# Patient Record
Sex: FEMALE | Race: WHITE | NOT HISPANIC OR LATINO | Employment: FULL TIME | URBAN - METROPOLITAN AREA
[De-identification: names, ages, dates, MRNs, and addresses within clinical notes are randomized per-mention and may not be internally consistent; named-entity substitution may affect disease eponyms.]

---

## 2023-07-26 ENCOUNTER — TELEPHONE (OUTPATIENT)
Dept: CARDIOLOGY | Facility: CLINIC | Age: 58
End: 2023-07-26
Payer: OTHER GOVERNMENT

## 2023-07-26 NOTE — TELEPHONE ENCOUNTER
She was at [facility], she was having elevated heart rates. She converted on her own. I had a cancellation for this Friday, she will be here.

## 2023-07-26 NOTE — TELEPHONE ENCOUNTER
----- Message from Sylwia Cancino sent at 7/26/2023 10:54 AM CDT -----  Type:  Sooner Appointment Request    Caller is requesting a sooner appointment.  Caller declined first available appointment listed below.  Caller will not accept being placed on the waitlist and is requesting a message be sent to doctor.    Name of Caller:  pt  When is the first available appointment?  8/24--said she need to be seen sooner--please call and advise  Symptoms:  hospital f/u racing heart-- need 2 week appt  Best Call Back Number:  [phone] (home)     Additional Information:  thank you

## 2023-07-28 ENCOUNTER — OFFICE VISIT (OUTPATIENT)
Dept: CARDIOLOGY | Facility: CLINIC | Age: 58
End: 2023-07-28
Payer: OTHER GOVERNMENT

## 2023-07-28 VITALS
DIASTOLIC BLOOD PRESSURE: 80 MMHG | HEART RATE: 60 BPM | OXYGEN SATURATION: 98 % | HEIGHT: 67 IN | WEIGHT: 150 LBS | SYSTOLIC BLOOD PRESSURE: 120 MMHG | BODY MASS INDEX: 23.54 KG/M2 | RESPIRATION RATE: 16 BRPM

## 2023-07-28 DIAGNOSIS — I48.0 PAROXYSMAL ATRIAL FIBRILLATION: ICD-10-CM

## 2023-07-28 DIAGNOSIS — R07.89 OTHER CHEST PAIN: Primary | ICD-10-CM

## 2023-07-28 PROCEDURE — 93005 ELECTROCARDIOGRAM TRACING: CPT | Mod: PBBFAC,PN | Performed by: INTERNAL MEDICINE

## 2023-07-28 PROCEDURE — 99214 OFFICE O/P EST MOD 30 MIN: CPT | Mod: PBBFAC,PN | Performed by: INTERNAL MEDICINE

## 2023-07-28 PROCEDURE — 99204 PR OFFICE/OUTPT VISIT, NEW, LEVL IV, 45-59 MIN: ICD-10-PCS | Mod: S$PBB,,, | Performed by: INTERNAL MEDICINE

## 2023-07-28 PROCEDURE — 99204 OFFICE O/P NEW MOD 45 MIN: CPT | Mod: S$PBB,,, | Performed by: INTERNAL MEDICINE

## 2023-07-28 PROCEDURE — 93010 ELECTROCARDIOGRAM REPORT: CPT | Mod: S$PBB,,, | Performed by: INTERNAL MEDICINE

## 2023-07-28 PROCEDURE — 99999 PR PBB SHADOW E&M-EST. PATIENT-LVL IV: ICD-10-PCS | Mod: PBBFAC,,, | Performed by: INTERNAL MEDICINE

## 2023-07-28 PROCEDURE — 93010 EKG 12-LEAD: ICD-10-PCS | Mod: S$PBB,,, | Performed by: INTERNAL MEDICINE

## 2023-07-28 PROCEDURE — 99999 PR PBB SHADOW E&M-EST. PATIENT-LVL IV: CPT | Mod: PBBFAC,,, | Performed by: INTERNAL MEDICINE

## 2023-07-28 RX ORDER — DEXTROAMPHETAMINE SACCHARATE, AMPHETAMINE ASPARTATE MONOHYDRATE, DEXTROAMPHETAMINE SULFATE AND AMPHETAMINE SULFATE 5; 5; 5; 5 MG/1; MG/1; MG/1; MG/1
20 CAPSULE, EXTENDED RELEASE ORAL EVERY MORNING
COMMUNITY

## 2023-07-28 RX ORDER — ATENOLOL 25 MG/1
25 TABLET ORAL DAILY
COMMUNITY
End: 2023-09-13

## 2023-07-28 RX ORDER — NAPROXEN SODIUM 220 MG/1
81 TABLET, FILM COATED ORAL DAILY
COMMUNITY
End: 2023-09-13

## 2023-07-28 RX ORDER — DEXTROAMPHETAMINE SACCHARATE, AMPHETAMINE ASPARTATE MONOHYDRATE, DEXTROAMPHETAMINE SULFATE AND AMPHETAMINE SULFATE 2.5; 2.5; 2.5; 2.5 MG/1; MG/1; MG/1; MG/1
10 CAPSULE, EXTENDED RELEASE ORAL EVERY MORNING
COMMUNITY

## 2023-07-28 RX ORDER — DIAZEPAM 2 MG/1
2 TABLET ORAL EVERY 6 HOURS PRN
COMMUNITY

## 2023-07-28 RX ORDER — POTASSIUM CHLORIDE 750 MG/1
10 TABLET, EXTENDED RELEASE ORAL
COMMUNITY
Start: 2023-05-08

## 2023-07-28 RX ORDER — ESTRADIOL 1 MG/1
1 TABLET ORAL
COMMUNITY
Start: 2023-07-05

## 2023-07-28 RX ORDER — PROPAFENONE HYDROCHLORIDE 300 MG/1
300 TABLET, COATED ORAL 3 TIMES DAILY
COMMUNITY
Start: 2023-07-05 | End: 2023-09-29

## 2023-07-28 NOTE — ASSESSMENT & PLAN NOTE
She has chest pressure associated with rapid ventricular rates.  A recommend to do the   1. Recommend to obtain a symptom limited exercise stress test 1 to evaluate for exercise-induced arrhythmia and breakthrough and also a nuclear imaging study to evaluate for any progression of ischemic heart disease could be contributing.    2. Obtain echocardiogram for chamber size measurements and valvular morphology  Her last echocardiogram shows LV function is normal and left atrial size also normal.  Although this is encouraging need to confirm that this is still maintain the same.

## 2023-07-28 NOTE — PROGRESS NOTES
Subjective:    Patient ID:  Lor Albarran is a 57 y.o. female patient here for evaluation Establish Care (She was seen in [facility], she was in Afib, she was given a dose of Cardizem 20 mg and converted on her own)      History of Present Illness:  Patient is a 57-year-old lady has history of atrial arrhythmias initially diagnosed as supraventricular tachycardia subsequently to have some atrial fibrillation with rapid ventricular rates noted.  She had full workup in [place] in March of 2022 and since then she is been maintaining Rythmol 300 mg 3 times a day along with atenolol 25 mg once a day and potassium supplements.  She has been having intermittent episodes of breakthrough palpitations with varying intensity and duration but more recently about a week ago she presented to [facility] both more sustained palpitations heart rate was up to 180 beats per minute with a rapid ventricular rates.  And she was treated with Cardizem at 20 mg bolus with subsequent conversion to sinus rhythm after while.  This leftover with sensation of fullness in her chest anxiety feeling and she was advised admission although since patient was regular rhythm she was discharged home.    She is now seeking follow-up evaluation regarding the same.  Sustained palpitations occur at least once a month sometimes more often but intermittent minor palpitations occur throughout the day with few flutters in her chest and then resolved spontaneously especially when she is lying down.  No cough or congestion no fevers chills no nausea vomiting no blood in the stools black stools she is doing full-time work 3 days a week.  Twelve hours scheduled each day          Review of patient's allergies indicates:   Allergen Reactions    Sulfa (sulfonamide antibiotics)        History reviewed. No pertinent past medical history.  History reviewed. No pertinent surgical history.  Social History     Tobacco Use    Smoking status: Never    Smokeless  tobacco: Never        Review of Systems   As noted in HPI in addition     Constitutional: Negative for chills, fatigue and fever.   Eyes: No double vision, No blurred vision  Neuro: No headaches, No dizziness  Respiratory: Negative for cough, shortness of breath and wheezing.    Cardiovascular: Negative for chest pain.  Intermittent episodes of palpitations noted..   Gastrointestinal: Negative for abdominal pain, No melena, diarrhea, nausea and vomiting.   Genitourinary: Negative for dysuria and frequency, Negative for hematuria  Skin: Negative for bruising, Negative for edema or discoloration noted.   Endocrine: Negative for polyphagia, Negative for heat intolerance, Negative for cold intolerance  Psychiatric: Negative for depression, Negative for anxiety, Negative for memory loss  Musculoskeletal: Negative for neck pain, Negative for muscle weakness, Negative for back pain   She does feel anxious at times and feels like her heart is pounding intermittently.  Patient has been maintain on Adderall often at 20 mg once a day       Objective        Vitals:    07/28/23 0930   BP: 120/80   Pulse: 60   Resp: 16       LIPIDS - LAST 2   No results found for: CHOL, HDL, LDLCALC, TRIG, CHOLHDL    CBC - LAST 2  No results found for: WBC, RBC, HGB, HCT, MCV, MCH, MCHC, RDW, PLT, MPV, GRAN, LYMPH, MONO, BASO, NRBC    CHEMISTRY & LIVER FUNCTION - LAST 2  No results found for: NA, K, CL, CO2, ANIONGAP, BUN, CREATININE, GLU, CALCIUM, PH, MG, ALBUMIN, PROT, ALKPHOS, ALT, AST, BILITOT     CARDIAC PROFILE - LAST 2  No results found for: BNP, CPK, CPKMB, LDH, TROPONINI, TROPONINIHS     COAGULATION - LAST 2  No results found for: LABPT, INR, APTT    ENDOCRINE & PSA - LAST 2  No results found for: HGBA1C, MICROALBUR, TSH, PROCAL, PSA     ECHOCARDIOGRAM RESULTS  No results found for this or any previous visit.      CURRENT/PREVIOUS VISIT EKG  No results found for this or any previous visit.  No valid procedures specified.   No results  found for this or any previous visit.    No valid procedures specified.    March of 2022  Findings   Left Ventricle   The left ventricle is normal in size, thickness, and systolic function.   The EF is estimated at about 60%.   The LV diastolic filling pattern appears normal.   Left Atrium   The left atrial chamber is normal.   Right Ventricle   The right ventricular size ,thickness and systolic function are within   normal limits.   Right Atrium   The right atrium is normal.   Mitral Valve   The mitral valve appears thickened.   There is trace mitral regurgitation present.   Aortic Valve   The aortic valve leaflets are mildly thickened.   Tricuspid Valve   The tricuspid valve appears normal. There is trace tricuspid regurgitation   present. Pulmonary hypertension was not present.   Pulmonic Valve   The pulmonic valve appears normal.   Pericardial Effusion   The pericardium appears normal.   Pleural Effusion   No evidence of pleural effusion.   Miscellaneous   The aorta appears normal.     PREVIOUS STRESS TEST              PREVIOUS ANGIOGRAM        PHYSICAL EXAM    GENERAL: well built, well nourished, well-developed in no apparent distress alert and oriented.   HEENT: Normocephalic. Pupils normal and conjunctivae normal.  Mucous membranes normal, no cyanosis or icterus, trachea central,no pallor or icterus is noted..   NECK: No JVD. No bruit..   THYROID: Thyroid not enlarged. No nodules present..   CARDIAC: Regular rate and rhythm. S1 is normal.S2 is normal.No gallops, clicks or murmurs noted at this time.  CHEST ANATOMY: normal.   LUNGS: Clear to auscultation. No wheezing or rhonchi..   ABDOMEN: Soft no masses or organomegaly.  No abdomen pulsations or bruits.  Normal bowel sounds. No pulsations and no masses felt, No guarding or rebound.   EXTREMITIES: No cyanosis, clubbing or edema noted at this time., no calf tenderness bilaterally.   PERIPHERAL VASCULAR SYSTEM: Good palpable distal pulses.   CENTRAL NERVOUS  SYSTEM: No focal motor or sensory deficits noted.   SKIN: Skin without lesions, moist, well perfused.   MUSCLE STRENGTH & TONE: No noteable weakness, atrophy or abnormal movement.     I HAVE REVIEWED :    The vital signs, nurses notes, and all the pertinent radiology and labs.    07/28/2023 EKG shows normal sinus rhythm rate of 69 beats per minute incomplete right bundle branch morphology borderline criteria for LVH all his normal variant.  QTC is 469 milliseconds.  Other mild QT prolongation this is unchanged from previous EKG of March 2, 2022.    Current Outpatient Medications   Medication Instructions    aspirin 81 mg, Oral, Daily    atenoloL (TENORMIN) 25 mg, Oral, Daily, qhs    dextroamphetamine-amphetamine (ADDERALL XR) 10 MG 24 hr capsule 10 mg, Oral, Every morning, Half when working     dextroamphetamine-amphetamine (ADDERALL XR) 20 MG 24 hr capsule 20 mg, Oral, Every morning    diazePAM (VALIUM) 2 mg, Oral, Every 6 hours PRN    estradioL (ESTRACE) 1 mg, Oral    potassium chloride (KLOR-CON) 10 MEQ TbSR 10 mEq, Oral    propafenone (RYTHMOL) 300 mg, Oral, 3 times daily          Assessment & Plan     Paroxysmal atrial fibrillation  Currently she is maintaining on Rythmol 300 mg 3 times a day I have encouraged her to continue the same continue on atenolol at least 25 mg once daily.    Add magnesium oxide 400 mg daily to her regimen and addition to potassium 10 mEq a day.    She is currently on aspirin 81 mg a day chads2 score is 0 and continue the same.  For further investigation out encourage her to obtain a 7 day event recorder to assess the density and frequency of breakthrough on her monitor.    Other chest pain  She has chest pressure associated with rapid ventricular rates.  A recommend to do the   1. Recommend to obtain a symptom limited exercise stress test 1 to evaluate for exercise-induced arrhythmia and breakthrough and also a nuclear imaging study to evaluate for any progression of ischemic heart  disease could be contributing.    2. Obtain echocardiogram for chamber size measurements and valvular morphology  Her last echocardiogram shows LV function is normal and left atrial size also normal.  Although this is encouraging need to confirm that this is still maintain the same.    Going to avoid any provoking agents such as anti histamines other than H2 blockers H1 blockers she is on Zyrtec encouraged her to continue same.    Also avoid any drugs that can potentially prolonged QT interval including some antibiotics and like agents.      No follow-ups on file.

## 2023-07-28 NOTE — ASSESSMENT & PLAN NOTE
Currently she is maintaining on Rythmol 300 mg 3 times a day I have encouraged her to continue the same continue on atenolol at least 25 mg once daily.    Add magnesium oxide 400 mg daily to her regimen and addition to potassium 10 mEq a day.    She is currently on aspirin 81 mg a day chads2 score is 0 and continue the same.  For further investigation out encourage her to obtain a 7 day event recorder to assess the density and frequency of breakthrough on her monitor.

## 2023-08-01 DIAGNOSIS — I48.0 PAROXYSMAL ATRIAL FIBRILLATION: Primary | ICD-10-CM

## 2023-08-18 ENCOUNTER — TELEPHONE (OUTPATIENT)
Dept: CARDIOLOGY | Facility: CLINIC | Age: 58
End: 2023-08-18
Payer: OTHER GOVERNMENT

## 2023-08-18 ENCOUNTER — TELEPHONE (OUTPATIENT)
Dept: CARDIOLOGY | Facility: HOSPITAL | Age: 58
End: 2023-08-18

## 2023-08-18 NOTE — TELEPHONE ENCOUNTER
----- Message from Roel Ghosh sent at 8/18/2023 11:21 AM CDT -----  Regarding: Return Call  Contact: patient  Type:  Patient Returning Call    Who Called:patient  Who Left Message for Patient:office nurse  Does the patient know what this is regarding?:rescheduling upcoming appointment  Would the patient rather a call back or a response via [facility]? call  Best Call Back Number:[phone]  Additional Information:

## 2023-08-18 NOTE — TELEPHONE ENCOUNTER
Left message on voicemail.    Patient advised, test will be at [facility] ([address]  Will need to register on the first floor at the main entrance.  Patient advised that arrival time is 7:20.  Patient advised that she may be here about 3.5-4 hours, and may want to bring something to occupy their time, as there will be periods of waiting.    Patient advised, may take her medications prior to testing if you need to.  Patient should HOLD Atenolol. Advised if she needs to eat to take her medications, please keep it light, like toast and juice.    Patient advised to avoid all caffeine 12 hours prior to testing.  This includes decaf tea and coffee.    Will provide peanut butter crackers for a snack after stress test.  If patient would prefer something else, please bring a snack from home.    Wear comfortable clothing.  No lotions, oils, or powders to the upper chest area. May wear deodorant.    No metal jewelry, buttons, or zippers to the upper body.  Advised to call the office if any questions.    Will send instructions via [facility] as well.

## 2023-08-30 ENCOUNTER — TELEPHONE (OUTPATIENT)
Dept: CARDIOLOGY | Facility: CLINIC | Age: 58
End: 2023-08-30
Payer: OTHER GOVERNMENT

## 2023-08-30 NOTE — TELEPHONE ENCOUNTER
----- Message from Corey Nelson sent at 8/30/2023  1:49 PM CDT -----  Type: Need Medical Advice   Who Called:Yonis Smith callback number:   Additional Information:  of patient would like a call back to discuss his wife condition, she is currently in the hospital at [facility]  Please call to further assist, Thanks.

## 2023-09-01 ENCOUNTER — TELEPHONE (OUTPATIENT)
Dept: CARDIOLOGY | Facility: CLINIC | Age: 58
End: 2023-09-01
Payer: OTHER GOVERNMENT

## 2023-09-01 NOTE — TELEPHONE ENCOUNTER
----- Message from Erica Chang NP sent at 9/1/2023 12:42 PM CDT -----  Patient's PCP reached out to me today. Looks like this patient was recently hospitalized and has had issues with Afib. Her  called on 8/30/23. They are needing a call back to discuss and hopefully get a follow up appointment. Thank you!

## 2023-09-05 NOTE — TELEPHONE ENCOUNTER
Genny called and spoke with the daughter. Dr. Lockwood advised them to have the eval of the watchman while she is at [facility]

## 2023-09-11 ENCOUNTER — TELEPHONE (OUTPATIENT)
Dept: CARDIOLOGY | Facility: CLINIC | Age: 58
End: 2023-09-11
Payer: OTHER GOVERNMENT

## 2023-09-11 NOTE — TELEPHONE ENCOUNTER
----- Message from Roel Ghosh sent at 9/11/2023  1:38 PM CDT -----  Regarding: return call  Contact:  Calos Dover  Type:  Patient Returning Call    Who Called:Calos Dover   Who Left Message for Patient:office nurse  Does the patient know what this is regarding?:upcoming appointment/records from [facility]  Would the patient rather a call back or a response via [facility]? Please call to advise  Best Call Back Number:[phone]  Additional Information:

## 2023-09-13 ENCOUNTER — LAB VISIT (OUTPATIENT)
Dept: LAB | Facility: HOSPITAL | Age: 58
End: 2023-09-13
Attending: NURSE PRACTITIONER
Payer: OTHER GOVERNMENT

## 2023-09-13 ENCOUNTER — TELEPHONE (OUTPATIENT)
Dept: ELECTROPHYSIOLOGY | Facility: CLINIC | Age: 58
End: 2023-09-13
Payer: OTHER GOVERNMENT

## 2023-09-13 ENCOUNTER — OFFICE VISIT (OUTPATIENT)
Dept: CARDIOLOGY | Facility: CLINIC | Age: 58
End: 2023-09-13
Payer: OTHER GOVERNMENT

## 2023-09-13 VITALS
SYSTOLIC BLOOD PRESSURE: 102 MMHG | WEIGHT: 150 LBS | HEART RATE: 53 BPM | OXYGEN SATURATION: 99 % | BODY MASS INDEX: 23.49 KG/M2 | DIASTOLIC BLOOD PRESSURE: 67 MMHG

## 2023-09-13 DIAGNOSIS — I47.10 SVT (SUPRAVENTRICULAR TACHYCARDIA): Primary | ICD-10-CM

## 2023-09-13 DIAGNOSIS — I48.0 PAROXYSMAL ATRIAL FIBRILLATION: ICD-10-CM

## 2023-09-13 DIAGNOSIS — I47.10 SVT (SUPRAVENTRICULAR TACHYCARDIA): ICD-10-CM

## 2023-09-13 LAB
ANION GAP SERPL CALC-SCNC: 7 MMOL/L (ref 8–16)
BUN SERPL-MCNC: 15 MG/DL (ref 6–20)
CALCIUM SERPL-MCNC: 8.8 MG/DL (ref 8.7–10.5)
CHLORIDE SERPL-SCNC: 102 MMOL/L (ref 95–110)
CO2 SERPL-SCNC: 27 MMOL/L (ref 23–29)
CREAT SERPL-MCNC: 0.9 MG/DL (ref 0.5–1.4)
EST. GFR  (NO RACE VARIABLE): >60 ML/MIN/1.73 M^2
GLUCOSE SERPL-MCNC: 92 MG/DL (ref 70–110)
MAGNESIUM SERPL-MCNC: 2 MG/DL (ref 1.6–2.6)
POTASSIUM SERPL-SCNC: 4.1 MMOL/L (ref 3.5–5.1)
SODIUM SERPL-SCNC: 136 MMOL/L (ref 136–145)

## 2023-09-13 PROCEDURE — 99999 PR PBB SHADOW E&M-EST. PATIENT-LVL III: ICD-10-PCS | Mod: PBBFAC,,, | Performed by: NURSE PRACTITIONER

## 2023-09-13 PROCEDURE — 83735 ASSAY OF MAGNESIUM: CPT | Performed by: NURSE PRACTITIONER

## 2023-09-13 PROCEDURE — 93010 ELECTROCARDIOGRAM REPORT: CPT | Mod: S$PBB,,, | Performed by: INTERNAL MEDICINE

## 2023-09-13 PROCEDURE — 99213 OFFICE O/P EST LOW 20 MIN: CPT | Mod: PBBFAC,PN | Performed by: NURSE PRACTITIONER

## 2023-09-13 PROCEDURE — 80048 BASIC METABOLIC PNL TOTAL CA: CPT | Performed by: NURSE PRACTITIONER

## 2023-09-13 PROCEDURE — 36415 COLL VENOUS BLD VENIPUNCTURE: CPT | Performed by: NURSE PRACTITIONER

## 2023-09-13 PROCEDURE — 93005 ELECTROCARDIOGRAM TRACING: CPT | Mod: PBBFAC,PN | Performed by: INTERNAL MEDICINE

## 2023-09-13 PROCEDURE — 93010 EKG 12-LEAD: ICD-10-PCS | Mod: S$PBB,,, | Performed by: INTERNAL MEDICINE

## 2023-09-13 PROCEDURE — 99999 PR PBB SHADOW E&M-EST. PATIENT-LVL III: CPT | Mod: PBBFAC,,, | Performed by: NURSE PRACTITIONER

## 2023-09-13 PROCEDURE — 99214 PR OFFICE/OUTPT VISIT, EST, LEVL IV, 30-39 MIN: ICD-10-PCS | Mod: S$PBB,,, | Performed by: NURSE PRACTITIONER

## 2023-09-13 PROCEDURE — 99214 OFFICE O/P EST MOD 30 MIN: CPT | Mod: S$PBB,,, | Performed by: NURSE PRACTITIONER

## 2023-09-13 RX ORDER — SOTALOL HYDROCHLORIDE 80 MG/1
80 TABLET ORAL 2 TIMES DAILY
COMMUNITY
Start: 2023-08-31 | End: 2023-09-13

## 2023-09-13 RX ORDER — METOPROLOL SUCCINATE 25 MG/1
25 TABLET, EXTENDED RELEASE ORAL NIGHTLY
Qty: 30 TABLET | Refills: 11 | Status: SHIPPED | OUTPATIENT
Start: 2023-09-13 | End: 2024-03-12 | Stop reason: SDUPTHER

## 2023-09-13 RX ORDER — METOPROLOL TARTRATE 25 MG/1
50 TABLET, FILM COATED ORAL DAILY PRN
Qty: 90 TABLET | Refills: 3 | Status: SHIPPED | OUTPATIENT
Start: 2023-09-13 | End: 2024-03-08

## 2023-09-13 RX ORDER — LANOLIN ALCOHOL/MO/W.PET/CERES
400 CREAM (GRAM) TOPICAL DAILY
Qty: 45 TABLET | Refills: 3
Start: 2023-09-13

## 2023-09-13 NOTE — PROGRESS NOTES
Subjective:    Patient ID:  Lor Albarran is a 57 y.o. female   Chief Complaint   Patient presents with    Hospital Follow Up    Results         HPI:  Patient seen today post hospitalization with complaints of intermittent palpitations and episodes of SVT and paroxysmal AFib.  Patient was seen for her initial visit by Dr. ESTEVAN Lockwood earlier this year and had testing ordered however prior to completion she was [facility] due to SVT.  Patient reports also having gone into atrial fibrillation during that hospitalization.  She would a mildly abnormal troponin level and underwent nuclear stress testing which was negative for reversible ischemia.  Records are noted at today's visit.  Patient was changed off of propafenone and put on sotalol however she continued to have breakthrough palpitations and heart racing post hospitalization and started back on propafenone in addition to sotalol.  She did not tolerate Inderal during the hospitalization due to low blood pressure and low heart rate when in sinus rhythm.  She is significantly concerned about going in and out of AFib and especially in regards to stroke risk.  Her CHADS-VASc score is low however she believes she does go into AFib on a regular basis.  Recent cardiac event monitor did not note any episodes of AFib but did note 7 episodes of SVT.    Review of patient's allergies indicates:   Allergen Reactions    Sulfa (sulfonamide antibiotics)        History reviewed. No pertinent past medical history.  History reviewed. No pertinent surgical history.  Social History     Tobacco Use    Smoking status: Never    Smokeless tobacco: Never     History reviewed. No pertinent family history.     Review of Systems:   Per HPI         Objective:        Vitals:    09/13/23 0816   BP: 102/67   Pulse: (!) 53       Lab Results   Component Value Date     09/13/2023    K 4.1 09/13/2023     09/13/2023    CREATININE 0.9 09/13/2023     BUN 15 09/13/2023    CO2 27 09/13/2023        ECHOCARDIOGRAM RESULTS  No results found for this or any previous visit.        CURRENT/PREVIOUS VISIT EKG  Results for orders placed or performed in visit on 07/28/23   IN OFFICE EKG 12-LEAD (to Ramer)    Collection Time: 07/28/23  9:41 AM    Narrative    Test Reason : I48.0,R07.89,    Vent. Rate : 069 BPM     Atrial Rate : 069 BPM     P-R Int : 146 ms          QRS Dur : 110 ms      QT Int : 438 ms       P-R-T Axes : 071 -16 080 degrees     QTc Int : 469 ms    Normal sinus rhythm  Incomplete right bundle branch block  Minimal voltage criteria for LVH, may be normal variant ( [place] product )  Nonspecific T wave abnormality  Prolonged QT  Abnormal ECG  No previous ECGs available  Confirmed by Ten Lockwood MD ([id number]) on 8/6/2023 12:50:09 PM    Referred By: PEDRO   SELF           Confirmed By:Ten Lockwood MD     No valid procedures specified.   No results found for this or any previous visit.      Physical Exam:  CONSTITUTIONAL: No fever, no chills  HEENT: Normocephalic, atraumatic,pupils reactive to light                 NECK:  No JVD no carotid bruit  CVS: S1S2+, RRR  LUNGS: Clear  ABDOMEN: Soft, NT, BS+  EXTREMITIES: No cyanosis, edema  : No alexis catheter  NEURO: AAO X 3  PSY: Normal affect      Medication List with Changes/Refills   New Medications    APIXABAN (ELIQUIS) 5 MG TAB    Take 1 tablet (5 mg total) by mouth 2 (two) times daily.    MAGNESIUM OXIDE (MAG-OX) 400 MG (241.3 MG MAGNESIUM) TABLET    Take 1 tablet (400 mg total) by mouth once daily.    METOPROLOL SUCCINATE (TOPROL-XL) 25 MG 24 HR TABLET    Take 1 tablet (25 mg total) by mouth every evening.    METOPROLOL TARTRATE (LOPRESSOR) 25 MG TABLET    Take 2 tablets (50 mg total) by mouth daily as needed (HR >120). Take 1-2 tablets PRN when arrhythmias occur   Current Medications    DEXTROAMPHETAMINE-AMPHETAMINE (ADDERALL XR) 10 MG 24 HR CAPSULE    Take 10 mg by mouth every morning. Half when  working    DEXTROAMPHETAMINE-AMPHETAMINE (ADDERALL XR) 20 MG 24 HR CAPSULE    Take 20 mg by mouth every morning.    DIAZEPAM (VALIUM) 2 MG TABLET    Take 2 mg by mouth every 6 (six) hours as needed (muscle spasms).    ESTRADIOL (ESTRACE) 1 MG TABLET    Take 1 mg by mouth.    POTASSIUM CHLORIDE (KLOR-CON) 10 MEQ TBSR    Take 10 mEq by mouth.    PROPAFENONE (RYTHMOL) 300 MG TABLET    Take 300 mg by mouth 3 (three) times daily.   Discontinued Medications    ASPIRIN 81 MG CHEW    Take 81 mg by mouth once daily.    ATENOLOL (TENORMIN) 25 MG TABLET    Take 25 mg by mouth once daily. qhs    SOTALOL (BETAPACE) 80 MG TABLET    Take 80 mg by mouth 2 (two) times daily.             Assessment:       1. SVT (supraventricular tachycardia)    2. Paroxysmal atrial fibrillation         Plan:     Case discussed with Dr. Greene, EP.  Patient advised that she absolutely must avoid taking sotalol and propafenone together.  Instead we will continue on propafenone 300 mg p.o. t.i.d. which has held her out of AFib in the past.  Add metoprolol succinate 25 mg p.o. daily at night.    Will also give her metoprolol tartrate to take 1-2 tablets of 25 mg p.r.n. when episodes of SVT or AFib with RVR occur in attempt to keep her out of the hospital.    In the meantime I will refer her for an urgent consultation with electrophysiology at [facility].  She will be referred to Dr. Kalani Herzog since Dr. Greene will be out for some weeks.    I will also start her on Eliquis 5 mg p.o. b.i.d. due to frequent episodes of AFib.  Would like her to maintain on Eliquis 5 mg p.o. b.i.d. for minimum of 4 weeks after the most recent episode of Afib.    Labs checked today including BMP and magnesium. Labs at goal. Continue KCL 10 meq po daily. Add mag ox 400 mg po daily.   Follow up with EP and then in our office in about 6 weeks.     Problem List Items Addressed This Visit          Unprioritized    Paroxysmal atrial fibrillation    Relevant Medications     metoprolol succinate (TOPROL-XL) 25 MG 24 hr tablet    metoprolol tartrate (LOPRESSOR) 25 MG tablet    apixaban (ELIQUIS) 5 mg Tab    magnesium oxide (MAG-OX) 400 mg (241.3 mg magnesium) tablet    Other Relevant Orders    IN OFFICE EKG 12-LEAD (to Muse)    Basic metabolic panel (Completed)    Magnesium (Completed)    Ambulatory referral/consult to Electrophysiology    SVT (supraventricular tachycardia) - Primary    Relevant Medications    metoprolol succinate (TOPROL-XL) 25 MG 24 hr tablet    metoprolol tartrate (LOPRESSOR) 25 MG tablet    magnesium oxide (MAG-OX) 400 mg (241.3 mg magnesium) tablet    Other Relevant Orders    IN OFFICE EKG 12-LEAD (to Muse)    Basic metabolic panel (Completed)    Magnesium (Completed)    Ambulatory referral/consult to Electrophysiology       Follow up in about 6 weeks (around 10/25/2023).

## 2023-09-29 ENCOUNTER — OFFICE VISIT (OUTPATIENT)
Dept: ELECTROPHYSIOLOGY | Facility: CLINIC | Age: 58
End: 2023-09-29
Payer: OTHER GOVERNMENT

## 2023-09-29 VITALS — BODY MASS INDEX: 23.67 KG/M2 | HEIGHT: 67 IN | HEART RATE: 57 BPM | WEIGHT: 150.81 LBS

## 2023-09-29 DIAGNOSIS — I48.0 PAROXYSMAL ATRIAL FIBRILLATION: Primary | ICD-10-CM

## 2023-09-29 DIAGNOSIS — F33.41 RECURRENT MAJOR DEPRESSIVE DISORDER, IN PARTIAL REMISSION: ICD-10-CM

## 2023-09-29 DIAGNOSIS — F90.2 ATTENTION DEFICIT HYPERACTIVITY DISORDER (ADHD), COMBINED TYPE: ICD-10-CM

## 2023-09-29 DIAGNOSIS — I47.10 SVT (SUPRAVENTRICULAR TACHYCARDIA): ICD-10-CM

## 2023-09-29 DIAGNOSIS — F41.9 ANXIETY: ICD-10-CM

## 2023-09-29 PROBLEM — R07.89 OTHER CHEST PAIN: Status: RESOLVED | Noted: 2023-07-28 | Resolved: 2023-09-29

## 2023-09-29 PROCEDURE — 93010 ELECTROCARDIOGRAM REPORT: CPT | Mod: S$PBB,,, | Performed by: INTERNAL MEDICINE

## 2023-09-29 PROCEDURE — 99999 PR PBB SHADOW E&M-EST. PATIENT-LVL III: CPT | Mod: PBBFAC,,, | Performed by: STUDENT IN AN ORGANIZED HEALTH CARE EDUCATION/TRAINING PROGRAM

## 2023-09-29 PROCEDURE — 99205 OFFICE O/P NEW HI 60 MIN: CPT | Mod: S$PBB,,, | Performed by: STUDENT IN AN ORGANIZED HEALTH CARE EDUCATION/TRAINING PROGRAM

## 2023-09-29 PROCEDURE — 99205 PR OFFICE/OUTPT VISIT, NEW, LEVL V, 60-74 MIN: ICD-10-PCS | Mod: S$PBB,,, | Performed by: STUDENT IN AN ORGANIZED HEALTH CARE EDUCATION/TRAINING PROGRAM

## 2023-09-29 PROCEDURE — 99213 OFFICE O/P EST LOW 20 MIN: CPT | Mod: PBBFAC | Performed by: STUDENT IN AN ORGANIZED HEALTH CARE EDUCATION/TRAINING PROGRAM

## 2023-09-29 PROCEDURE — 93010 RHYTHM STRIP: ICD-10-PCS | Mod: S$PBB,,, | Performed by: INTERNAL MEDICINE

## 2023-09-29 PROCEDURE — 99999 PR PBB SHADOW E&M-EST. PATIENT-LVL III: ICD-10-PCS | Mod: PBBFAC,,, | Performed by: STUDENT IN AN ORGANIZED HEALTH CARE EDUCATION/TRAINING PROGRAM

## 2023-09-29 PROCEDURE — 93005 ELECTROCARDIOGRAM TRACING: CPT | Mod: PBBFAC | Performed by: INTERNAL MEDICINE

## 2023-09-29 RX ORDER — FLECAINIDE ACETATE 100 MG/1
100 TABLET ORAL EVERY 12 HOURS
Status: DISCONTINUED | OUTPATIENT
Start: 2023-09-29 | End: 2023-09-29

## 2023-09-29 RX ORDER — FLECAINIDE ACETATE 100 MG/1
100 TABLET ORAL EVERY 12 HOURS
Qty: 60 TABLET | Refills: 11 | Status: SHIPPED | OUTPATIENT
Start: 2023-09-29 | End: 2023-12-13 | Stop reason: SDUPTHER

## 2023-09-29 NOTE — PROGRESS NOTES
Electrophysiology Clinic Note    Reason for new patient visit: Evaluation and recommendations regarding paroxysmal atrial fibrillation.     PRESENTING HISTORY:     History of Present Illness:  Ms. Lor Albarran is a ya 57-year-old woman who presents today for evaluation and recommendations regarding paroxysmal atrial fibrillation. She has a past medical history significant for paroxysmal atrial fibrillation, depression, anxiety, and ADHD.     She is followed in general cardiology clinic with Dr. Lockwood and SHILPA Chang and was recently seen in clinic on 9/13/2023. She reports that following a COVID infection in January of 2022 that she started having episodic palpitations. She reports symptoms of palpitations, irregular heart beats, and chest fullness, lasting a matter of seconds to several minutes. She was initially seen in [place] with Dr. Tillman, and was started on propafenone for periods of reported SVT. She later had an event of paroxysmal atrial fibrillation captured on an event monitor and was increased to propafenone 300mg po TID with aspirin 81mg po daily. She reports that she was doing well on propafenone until about six months ago when she was started having increased episodes of palpitations that would last for longer periods, occasionally more than an hour. She was admitted to [facility] about six months ago with continued palpitations with recurrent paroxysmal atrial fibrillation, at times with RVR. She was transitioned to sotalol 80mg po BID. She took this medication for about a month and reports that it was ineffective, and she self-discontinued this agent and resumed therapy with her propafenone 300mg po TID. She was subsequently seen with SHILPA Chang and was started on metoprolol succinate 25mg po daily with metoprolol tartrate 25mg po PRN palpitations. She has cut out all caffeine and is drinking 1-2 glasses of wine nightly. This regimen has provided improved control of these events;  however, she continues to experience breakthrough palpitations about once per week. She is presently taking apixaban and denies any adverse bleeding events on this agent.     She presents today with her . In discussion with Ms. Albarran today, she tells me that she is feeling overall quite well. She denies any episodes of dizziness, lightheadedness, syncope/presyncope, shelton chest pain, nausea or vomiting, orthopnea, or PND. She reports intermittent palpitations as above, in addition to symptoms of chest and neck fullness. She reports very mild baseline shortness of breath and dyspnea with significant exertion. She can climb more than one flight of stairs prior to needing to take a break and continues to attempt to increase her level of physical activity.     Review of Systems:  Review of Systems   Constitutional:  Negative for activity change.   HENT:  Negative for nasal congestion, nosebleeds, postnasal drip, rhinorrhea, sinus pressure/congestion, sneezing and sore throat.    Respiratory:  Positive for chest tightness and shortness of breath. Negative for apnea, cough and wheezing.    Cardiovascular:  Positive for palpitations. Negative for chest pain and leg swelling.   Gastrointestinal:  Negative for abdominal distention, abdominal pain, anal bleeding, blood in stool, change in bowel habit, constipation, diarrhea, nausea and vomiting.   Genitourinary:  Negative for dysuria and hematuria.   Musculoskeletal:  Positive for arthralgias and back pain. Negative for gait problem.   Neurological:  Negative for dizziness, seizures, syncope, weakness, light-headedness, headaches and coordination difficulties.        PAST HISTORY:     Past Medical History:  - Paroxysmal atrial fibrillation  - Depression  - Anxiety  - ADHD    No past surgical history on file.    Family History:  She reports that her father has permanent atrial fibrillation.     Social History:  She  reports that she has never smoked. She has never  "used smokeless tobacco. No history on file for alcohol use and drug use. 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     MEDICATIONS & ALLERGIES:     Review of patient's allergies indicates:   Allergen Reactions    Sulfa (sulfonamide antibiotics)        Current Outpatient Medications on File Prior to Visit   Medication Sig Dispense Refill    apixaban (ELIQUIS) 5 mg Tab Take 1 tablet (5 mg total) by mouth 2 (two) times daily. 180 tablet 3    dextroamphetamine-amphetamine (ADDERALL XR) 10 MG 24 hr capsule Take 10 mg by mouth every morning. Half when working      dextroamphetamine-amphetamine (ADDERALL XR) 20 MG 24 hr capsule Take 20 mg by mouth every morning.      diazePAM (VALIUM) 2 MG tablet Take 2 mg by mouth every 6 (six) hours as needed (muscle spasms).      estradioL (ESTRACE) 1 MG tablet Take 1 mg by mouth.      magnesium oxide (MAG-OX) 400 mg (241.3 mg magnesium) tablet Take 1 tablet (400 mg total) by mouth once daily. 45 tablet 3    metoprolol succinate (TOPROL-XL) 25 MG 24 hr tablet Take 1 tablet (25 mg total) by mouth every evening. 30 tablet 11    metoprolol tartrate (LOPRESSOR) 25 MG tablet Take 2 tablets (50 mg total) by mouth daily as needed (HR >120). Take 1-2 tablets PRN when arrhythmias occur 90 tablet 3    potassium chloride (KLOR-CON) 10 MEQ TbSR Take 10 mEq by mouth.      propafenone (RYTHMOL) 300 MG tablet Take 300 mg by mouth 3 (three) times daily.       No current facility-administered medications on file prior to visit.        OBJECTIVE:     Vital Signs:  Pulse (!) 57   Ht 5' 7" (1.702 m)   Wt 68.4 kg (150 lb 12.7 oz)   BMI 23.62 kg/m²     Physical Exam:  Physical Exam  Constitutional:       General: She is not in acute distress.     Appearance: Normal appearance. She is not ill-appearing or diaphoretic.      Comments: Well-appearing woman in NAD.   HENT:      Head: Normocephalic and atraumatic.      Nose: Nose normal.      Mouth/Throat:      " "Mouth: Mucous membranes are moist.      Pharynx: Oropharynx is clear.   Eyes:      Pupils: Pupils are equal, round, and reactive to light.   Cardiovascular:      Rate and Rhythm: Regular rhythm. Bradycardia present.      Pulses: Normal pulses.      Heart sounds: Normal heart sounds. No murmur heard.     No friction rub. No gallop.   Pulmonary:      Effort: Pulmonary effort is normal. No respiratory distress.      Breath sounds: Normal breath sounds. No wheezing, rhonchi or rales.   Chest:      Chest wall: No tenderness.   Abdominal:      General: There is no distension.      Palpations: Abdomen is soft.      Tenderness: There is no abdominal tenderness.   Musculoskeletal:         General: No swelling or tenderness.      Cervical back: Normal range of motion.      Right lower leg: No edema.      Left lower leg: No edema.   Skin:     General: Skin is warm and dry.      Findings: No erythema, lesion or rash.   Neurological:      General: No focal deficit present.      Mental Status: She is alert and oriented to person, place, and time. Mental status is at baseline.      Motor: No weakness.      Gait: Gait normal.   Psychiatric:         Mood and Affect: Mood normal.         Behavior: Behavior normal.        Laboratory Data:  No results found for: "WBC", "HGB", "HCT", "MCV", "PLT"  Lab Results   Component Value Date    GLU 92 09/13/2023     09/13/2023    K 4.1 09/13/2023     09/13/2023    CO2 27 09/13/2023    BUN 15 09/13/2023    CREATININE 0.9 09/13/2023    CALCIUM 8.8 09/13/2023    MG 2.0 09/13/2023     No results found for: "INR", "PROTIME"    Pertinent Cardiac Data:  ECG: Sinus bradycardia with rate of 57 bpm,  ms, IVCD with  ms, QT/QTc 442/430 ms, nonspecific STT changes.     30-Day Ambulatory Event Monitor - 8/2/2023:    Patient had a min HR of 51 bpm, max HR of 167 bpm, and avg HR of 69 bpm. Predominant underlying rhythm was Sinus Rhythm    7 Supraventricular Tachycardia runs occurred, the " run with the fastest interval lasting 4 beats with a max rate of 167 bpm, the longest lasting 39.8 secs with an avg rate of 110 bpm. Supraventricular Tachycardia was detected within +/- 45 seconds of symptomatic patient event(s).    Isolated SVEs were rare (<1.0%), SVE Couplets were rare (<1.0%), and no SVE Triplets were present.    Isolated VEs were rare (<1.0%), and no VE Couplets or VE Triplets were present    Patient activated. The patient complained of 14 episodes. The symptom(s) included chest pain and palpitations. The corresponding rhythm to the patient reported event was SVT.    Monitor Reported Event The patient had SVT.    There were 60 auto-triggered events    See final report    Resting 2D Transthoracic Echocardiogram - 8/29/2023 at [facility]:  - Normal systolic function with LVEF 60-65%. No RWMA.  - Right ventricle normal size and function.   - Normal biatrial size.  - Normal diastolic function.     Pharmacologic Nuclear Cardiac Stress Test - SPECT - 8/30/2023 at [facility]:  - Normal SPECT with no evidence of ischemia or infarction. Normal LVEF. Paroxysmal atrial fibrillation with RVR noted during the study.      ASSESSMENT & PLAN:   Ms. Lor Albarran is a ya 57-year-old woman who presents today for evaluation and recommendations regarding paroxysmal atrial fibrillation. She has a past medical history significant for paroxysmal atrial fibrillation, depression, anxiety, and ADHD.     - We discussed the pathophysiology of atrial fibrillation; specifically, we discussed paroxysmal atrial fibrillation and the concept that some patients may experience paroxysms of atrial fibrillation interrupting periods of sinus rhythm. We discussed that even a relatively low burden of atrial fibrillation continues to have an increased risk of CVA.   - She has remained on propafenone 300mg po TID with improved control of her atrial fibrillation; however, she reports symptoms of transient nausea  and GI upset on this agent. She has failed a prior trial of sotalol therapy. Unfortunately, she continues to have episodes of breakthrough palpitations on propafenone in addition to GI symptoms and would be interested in modification to an alternate agent. We will discontinue her propafenone at this time. She has no history of underlying coronary artery disease, and the results of a recent pharmacologic nuclear stress SPECT revealed no evidence of ischemia, with no evidence of structural heart disease appreciated on echocardiogram. Recently obtained laboratory assessment did not reveal any contraindications to flecainide therapy, and this agent should not interact with her other medications. We will start flecainide 100mg po BID.    - Flecainide displays use-dependency, and jordy blocking agents are generally jointly prescribed with class Ic agents. She is presently maintained on metoprolol succinate 25mg po daily, with metoprolol tartrate 25mg po PRN an episode of palpitations. We will continue this regimen in addition to her flecainide.   - Her [id number] is only 1 (female gender, age less than 64), portending an annual adjusted risk of CVA of 1.3%. She remains on uninterrupted apixaban therapy with no bleeding events reported.   - We discussed the possibility of catheter ablation with pulmonary vein isolation should she continue to have symptoms and AF despite AAD therapy. She voices understanding, and reports that she would consider ablation in the future in the event her atrial fibrillation burden increases.  - Possible underlying drivers of atrial fibrillation were addressed at this appointment, including recommendations for maintenance of a healthy BMI - now a class I recommendation. Review of laboratory data revealed acceptable TSH at 1.910 at [facility]. A request for sleep study was deferred at this time, as she denies any loud snoring or excessive daytime sleepiness.  - We discussed that her Adderall may  act as mild cardiostimulant medication. Generally this effect is insignificant, but may cause her paroxysms of atrial fibrillation to be more frequent. Unfortunately, she does not feel that these agents can be stopped, as her ADHD is presently well-controlled.      This patient will return to clinic with me in three months with flecainide initiation and maintenance of apixaban therapy in the interim. All questions and concerns were addressed at this encounter.     Signing Physician:       GABO Herzog MD  Electrophysiology Attending

## 2023-11-07 ENCOUNTER — OFFICE VISIT (OUTPATIENT)
Dept: CARDIOLOGY | Facility: CLINIC | Age: 58
End: 2023-11-07
Payer: OTHER GOVERNMENT

## 2023-11-07 VITALS
HEART RATE: 63 BPM | BODY MASS INDEX: 23.39 KG/M2 | SYSTOLIC BLOOD PRESSURE: 108 MMHG | WEIGHT: 149 LBS | HEIGHT: 67 IN | OXYGEN SATURATION: 99 % | DIASTOLIC BLOOD PRESSURE: 68 MMHG

## 2023-11-07 DIAGNOSIS — I48.0 PAROXYSMAL ATRIAL FIBRILLATION: Primary | ICD-10-CM

## 2023-11-07 DIAGNOSIS — I47.10 SVT (SUPRAVENTRICULAR TACHYCARDIA): ICD-10-CM

## 2023-11-07 PROCEDURE — 99999 PR PBB SHADOW E&M-EST. PATIENT-LVL IV: CPT | Mod: PBBFAC,,, | Performed by: NURSE PRACTITIONER

## 2023-11-07 PROCEDURE — 99214 OFFICE O/P EST MOD 30 MIN: CPT | Mod: PBBFAC,PN | Performed by: NURSE PRACTITIONER

## 2023-11-07 PROCEDURE — 99214 PR OFFICE/OUTPT VISIT, EST, LEVL IV, 30-39 MIN: ICD-10-PCS | Mod: S$PBB,,, | Performed by: NURSE PRACTITIONER

## 2023-11-07 PROCEDURE — 99999 PR PBB SHADOW E&M-EST. PATIENT-LVL IV: ICD-10-PCS | Mod: PBBFAC,,, | Performed by: NURSE PRACTITIONER

## 2023-11-07 PROCEDURE — 99214 OFFICE O/P EST MOD 30 MIN: CPT | Mod: S$PBB,,, | Performed by: NURSE PRACTITIONER

## 2023-11-07 NOTE — PROGRESS NOTES
Subjective:    Patient ID:  Lor Dover is a 57 y.o. female   Chief Complaint   Patient presents with    Follow-up       HPI:  Patient presents today for follow-up appointment. She is feeling good overall. She reports only transient palpitations at times. When this occurs, she drinks water and take extra magnesium. She reports compliance with medications which were adjusted by EP at her evaluation. Patient has no complaints of chest pain, dizziness, shortness of breath, or syncope. Denies any falls or head injuries.  Denies any blood in the stool or in the urine.      Review of patient's allergies indicates:   Allergen Reactions    Sulfa (sulfonamide antibiotics)        History reviewed. No pertinent past medical history.  History reviewed. No pertinent surgical history.  Social History     Tobacco Use    Smoking status: Never    Smokeless tobacco: Never     History reviewed. No pertinent family history.     Review of Systems:   Per HPI         Objective:        Vitals:    11/07/23 1007   BP: 108/68   Pulse: 63       Lab Results   Component Value Date     09/13/2023    K 4.1 09/13/2023     09/13/2023    CREATININE 0.9 09/13/2023    BUN 15 09/13/2023    CO2 27 09/13/2023        ECHOCARDIOGRAM RESULTS  No results found for this or any previous visit.        CURRENT/PREVIOUS VISIT EKG  Results for orders placed or performed in visit on 09/13/23   IN OFFICE EKG 12-LEAD (to [place])    Collection Time: 09/13/23  8:26 AM    Narrative    Test Reason : I48.0,    Vent. Rate : 055 BPM     Atrial Rate : 055 BPM     P-R Int : 162 ms          QRS Dur : 114 ms      QT Int : 482 ms       P-R-T Axes : 068 054 028 degrees     QTc Int : 461 ms    Sinus bradycardia  Incomplete right bundle branch block  Nonspecific T wave abnormality  Prolonged QT  Abnormal ECG  When compared with ECG of 28-JUL-2023 09:41,  No significant change was found  Confirmed by Humphrey Lockwood MD ([id number]) on 9/21/2023 6:28:19 PM    Referred By:              Confirmed By:Humphrey Lockwood MD     No valid procedures specified.   No results found for this or any previous visit.      Physical Exam:  CONSTITUTIONAL: No fever, no chills  HEENT: Normocephalic, atraumatic,pupils reactive to light                 NECK:  No JVD no carotid bruit  CVS: S1S2+, RRR, no murmurs,   LUNGS: Clear  ABDOMEN: Soft, NT, BS+  EXTREMITIES: No cyanosis, edema  : No alexis catheter  NEURO: AAO X 3  PSY: Normal affect      Medication List with Changes/Refills   Current Medications    APIXABAN (ELIQUIS) 5 MG TAB    Take 1 tablet (5 mg total) by mouth 2 (two) times daily.    DEXTROAMPHETAMINE-AMPHETAMINE (ADDERALL XR) 10 MG 24 HR CAPSULE    Take 10 mg by mouth every morning. Half when working    DEXTROAMPHETAMINE-AMPHETAMINE (ADDERALL XR) 20 MG 24 HR CAPSULE    Take 20 mg by mouth every morning.    DIAZEPAM (VALIUM) 2 MG TABLET    Take 2 mg by mouth every 6 (six) hours as needed (muscle spasms).    ESTRADIOL (ESTRACE) 1 MG TABLET    Take 1 mg by mouth.    FLECAINIDE (TAMBOCOR) 100 MG TAB    Take 1 tablet (100 mg total) by mouth every 12 (twelve) hours.    MAGNESIUM OXIDE (MAG-OX) 400 MG (241.3 MG MAGNESIUM) TABLET    Take 1 tablet (400 mg total) by mouth once daily.    METOPROLOL SUCCINATE (TOPROL-XL) 25 MG 24 HR TABLET    Take 1 tablet (25 mg total) by mouth every evening.    METOPROLOL TARTRATE (LOPRESSOR) 25 MG TABLET    Take 2 tablets (50 mg total) by mouth daily as needed (HR >120). Take 1-2 tablets PRN when arrhythmias occur    POTASSIUM CHLORIDE (KLOR-CON) 10 MEQ TBSR    Take 10 mEq by mouth.             Assessment:       1. Paroxysmal atrial fibrillation    2. SVT (supraventricular tachycardia)         Plan:     Problem List Items Addressed This Visit          Unprioritized    Paroxysmal atrial fibrillation - Primary    Current Assessment & Plan     Patient has been followed by EP and was started on flecanide 100 mg po BID. She remains on metoprolol succinate 25 mg po QHS. She has  not required PRN metoprolol tartrate.   She is on ELiquis 5 mg po BID.          SVT (supraventricular tachycardia)       Follow up in about 6 months (around 5/7/2024).

## 2023-11-07 NOTE — ASSESSMENT & PLAN NOTE
Patient has been followed by EP and was started on flecanide 100 mg po BID. She remains on metoprolol succinate 25 mg po QHS. She has not required PRN metoprolol tartrate.   She is on ELiquis 5 mg po BID.

## 2023-11-16 DIAGNOSIS — I48.0 PAROXYSMAL ATRIAL FIBRILLATION: ICD-10-CM

## 2023-12-13 RX ORDER — FLECAINIDE ACETATE 100 MG/1
100 TABLET ORAL EVERY 12 HOURS
Qty: 60 TABLET | Refills: 11 | Status: SHIPPED | OUTPATIENT
Start: 2023-12-13 | End: 2024-12-12

## 2023-12-19 DIAGNOSIS — I48.0 PAROXYSMAL ATRIAL FIBRILLATION: Primary | ICD-10-CM

## 2024-02-23 ENCOUNTER — TELEPHONE (OUTPATIENT)
Dept: ELECTROPHYSIOLOGY | Facility: CLINIC | Age: 59
End: 2024-02-23
Payer: OTHER GOVERNMENT

## 2024-03-08 DIAGNOSIS — I48.0 PAROXYSMAL ATRIAL FIBRILLATION: ICD-10-CM

## 2024-03-08 DIAGNOSIS — I47.10 SVT (SUPRAVENTRICULAR TACHYCARDIA): ICD-10-CM

## 2024-03-08 RX ORDER — METOPROLOL TARTRATE 25 MG/1
TABLET, FILM COATED ORAL
Qty: 90 TABLET | Refills: 3 | Status: SHIPPED | OUTPATIENT
Start: 2024-03-08

## 2024-03-12 ENCOUNTER — OFFICE VISIT (OUTPATIENT)
Dept: ELECTROPHYSIOLOGY | Facility: CLINIC | Age: 59
End: 2024-03-12
Payer: OTHER GOVERNMENT

## 2024-03-12 ENCOUNTER — HOSPITAL ENCOUNTER (OUTPATIENT)
Dept: CARDIOLOGY | Facility: CLINIC | Age: 59
Discharge: HOME | End: 2024-03-12
Payer: OTHER GOVERNMENT

## 2024-03-12 VITALS
HEIGHT: 67 IN | DIASTOLIC BLOOD PRESSURE: 62 MMHG | SYSTOLIC BLOOD PRESSURE: 115 MMHG | BODY MASS INDEX: 23.49 KG/M2 | WEIGHT: 149.69 LBS | HEART RATE: 62 BPM

## 2024-03-12 DIAGNOSIS — F41.9 ANXIETY: ICD-10-CM

## 2024-03-12 DIAGNOSIS — F90.2 ATTENTION DEFICIT HYPERACTIVITY DISORDER (ADHD), COMBINED TYPE: ICD-10-CM

## 2024-03-12 DIAGNOSIS — I47.10 SVT (SUPRAVENTRICULAR TACHYCARDIA): ICD-10-CM

## 2024-03-12 DIAGNOSIS — I48.0 PAROXYSMAL ATRIAL FIBRILLATION: ICD-10-CM

## 2024-03-12 DIAGNOSIS — I48.0 PAROXYSMAL ATRIAL FIBRILLATION: Primary | ICD-10-CM

## 2024-03-12 DIAGNOSIS — F33.41 RECURRENT MAJOR DEPRESSIVE DISORDER, IN PARTIAL REMISSION: ICD-10-CM

## 2024-03-12 LAB
OHS QRS DURATION: 122 MS
OHS QTC CALCULATION: 456 MS

## 2024-03-12 PROCEDURE — 99213 OFFICE O/P EST LOW 20 MIN: CPT | Mod: PBBFAC,25 | Performed by: STUDENT IN AN ORGANIZED HEALTH CARE EDUCATION/TRAINING PROGRAM

## 2024-03-12 PROCEDURE — 99999 PR PBB SHADOW E&M-EST. PATIENT-LVL III: CPT | Mod: PBBFAC,,, | Performed by: STUDENT IN AN ORGANIZED HEALTH CARE EDUCATION/TRAINING PROGRAM

## 2024-03-12 PROCEDURE — 93010 ELECTROCARDIOGRAM REPORT: CPT | Mod: S$PBB,,, | Performed by: INTERNAL MEDICINE

## 2024-03-12 PROCEDURE — 99214 OFFICE O/P EST MOD 30 MIN: CPT | Mod: S$PBB,,, | Performed by: STUDENT IN AN ORGANIZED HEALTH CARE EDUCATION/TRAINING PROGRAM

## 2024-03-12 PROCEDURE — 93005 ELECTROCARDIOGRAM TRACING: CPT | Mod: PBBFAC | Performed by: INTERNAL MEDICINE

## 2024-03-12 RX ORDER — METOPROLOL SUCCINATE 25 MG/1
25 TABLET, EXTENDED RELEASE ORAL NIGHTLY
Qty: 90 TABLET | Refills: 3 | Status: SHIPPED | OUTPATIENT
Start: 2024-03-12 | End: 2025-03-12

## 2024-03-12 NOTE — PROGRESS NOTES
Electrophysiology Clinic Note    Reason for follow-up patient visit: Ongoing evaluation and recommendations regarding paroxysmal atrial fibrillation.     PRESENTING HISTORY:     History of Present Illness:  Ms. Lor Albarran is a ya 58-year-old woman who returns to clinic today for ongoing evaluation and recommendations regarding paroxysmal atrial fibrillation. She has a past medical history significant for paroxysmal atrial fibrillation, depression, anxiety, and ADHD.     She is followed in general cardiology clinic with Dr. Lockwood and SHILPA Chang and was previously seen in clinic on 9/13/2023. She reports that following a COVID infection in January of 2022 that she started having episodic palpitations. She reports symptoms of palpitations, irregular heart beats, and chest fullness, lasting a matter of seconds to several minutes. She was initially seen in [place] with Dr. Tillman, and was started on propafenone for periods of reported SVT. She later had an event of paroxysmal atrial fibrillation captured on an event monitor and was increased to propafenone 300mg po TID with aspirin 81mg po daily. She reports that she had been doing well on propafenone until mid 2023 when she started having increased episodes of palpitations that would last for longer periods, occasionally more than an hour. She was admitted to [facility] in 5/2023 with continued palpitations with recurrent paroxysmal atrial fibrillation, at times with RVR. She was transitioned to sotalol 80mg po BID. She took this medication for about a month and reports that it was ineffective, and she self-discontinued this agent and resumed therapy with her propafenone 300mg po TID. She was subsequently seen with SHILPA Chang and was started on metoprolol succinate 25mg po daily with metoprolol tartrate 25mg po PRN palpitations. She has cut out all caffeine and is drinking 1-2 glasses of wine nightly. This regimen provided improved control of these events;  "however, she continued to experience breakthrough palpitations about once per week. She is presently taking apixaban and denies any adverse bleeding events on this agent. At our previous appointment, we started flecainide 100mg po BID in addition to continuation of her metoprolol therapy. She reports that she has "been cured" from her atrial fibrillation and denies any subsequent sustained events of palpitations. She continues to report brief events lasting for a matter of seconds, but denies any limiting symptoms. Her father suffered a stoke on 2/1/2024, and she reports increased stress helping to take care of him. She required one dose of her metoprolol tartrate when she felt she experienced a brief event of palpitations. She denies any change in her symptoms while taking her Adderall. She reports heat intolerance and gradual weight gain, and attributes this to "getting older" and increased stress with her father's poor health.     In discussion with Ms. Albarran today, she tells me that she is feeling overall quite well, and feels "much better" since starting flecainide therapy. She denies any episodes of dizziness, lightheadedness, syncope/presyncope, shelton chest pain, nausea or vomiting, orthopnea, or PND. She reports intermittent palpitations as above, in addition to symptoms of chest and neck fullness. These events are decreased in frequency and duration while maintained on flecainide therapy. She reports mild baseline shortness of breath and dyspnea with significant exertion that remains stable. She can climb more than one flight of stairs prior to needing to take a break and continues to attempt to increase her level of physical activity. She reports continued poor sleep and occasional daytime fatigue.    Review of Systems:  Review of Systems   Constitutional:  Positive for fatigue. Negative for activity change.   HENT:  Positive for rhinorrhea and sinus pressure/congestion. Negative for nasal congestion, " nosebleeds, postnasal drip, sneezing and sore throat.    Respiratory:  Positive for chest tightness and shortness of breath. Negative for apnea, cough and wheezing.    Cardiovascular:  Positive for palpitations. Negative for chest pain and leg swelling.   Gastrointestinal:  Positive for reflux. Negative for abdominal distention, abdominal pain, anal bleeding, blood in stool, change in bowel habit, constipation, diarrhea, nausea and vomiting.   Endocrine: Positive for heat intolerance.   Genitourinary:  Negative for dysuria and hematuria.   Musculoskeletal:  Positive for arthralgias and back pain. Negative for gait problem.   Neurological:  Positive for headaches. Negative for dizziness, seizures, syncope, weakness, light-headedness and coordination difficulties.   Psychiatric/Behavioral:  Positive for sleep disturbance.         PAST HISTORY:     Past Medical History:  - Paroxysmal atrial fibrillation  - Depression  - Anxiety  - ADHD    No past surgical history on file.    Family History:  She reports that her father has permanent atrial fibrillation and recently suffered a stroke on 2/1/2024.     Social History:  She  reports that she has never smoked. She has never used smokeless tobacco. No history on file for alcohol use and drug use. 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     MEDICATIONS & ALLERGIES:     Review of patient's allergies indicates:   Allergen Reactions    Sulfa (sulfonamide antibiotics)        Current Outpatient Medications on File Prior to Visit   Medication Sig Dispense Refill    apixaban (ELIQUIS) 5 mg Tab Take 1 tablet (5 mg total) by mouth 2 (two) times daily. 180 tablet 3    dextroamphetamine-amphetamine (ADDERALL XR) 10 MG 24 hr capsule Take 10 mg by mouth every morning. Half when working      dextroamphetamine-amphetamine (ADDERALL XR) 20 MG 24 hr capsule Take 20 mg by mouth every morning.      diazePAM (VALIUM) 2 MG tablet Take 2 mg by mouth  "every 6 (six) hours as needed (muscle spasms).      estradioL (ESTRACE) 1 MG tablet Take 1 mg by mouth.      flecainide (TAMBOCOR) 100 MG Tab Take 1 tablet (100 mg total) by mouth every 12 (twelve) hours. 60 tablet 11    magnesium oxide (MAG-OX) 400 mg (241.3 mg magnesium) tablet Take 1 tablet (400 mg total) by mouth once daily. 45 tablet 3    metoprolol succinate (TOPROL-XL) 25 MG 24 hr tablet Take 1 tablet (25 mg total) by mouth every evening. 30 tablet 11    metoprolol tartrate (LOPRESSOR) 25 MG tablet TAKE 2 TABLET BY MOUTH DAILY AS NEEDED 90 tablet 3    potassium chloride (KLOR-CON) 10 MEQ TbSR Take 10 mEq by mouth.       No current facility-administered medications on file prior to visit.        OBJECTIVE:     Vital Signs:  /62   Pulse 62   Ht 5' 7" (1.702 m)   Wt 67.9 kg (149 lb 11.1 oz)   BMI 23.45 kg/m²     Physical Exam:  Physical Exam  Constitutional:       General: She is not in acute distress.     Appearance: Normal appearance. She is not ill-appearing or diaphoretic.      Comments: Well-appearing woman in NAD.   HENT:      Head: Normocephalic and atraumatic.      Nose: Nose normal.      Mouth/Throat:      Mouth: Mucous membranes are moist.      Pharynx: Oropharynx is clear.   Eyes:      Pupils: Pupils are equal, round, and reactive to light.   Cardiovascular:      Rate and Rhythm: Normal rate and regular rhythm.      Pulses: Normal pulses.      Heart sounds: Normal heart sounds. No murmur heard.     No friction rub. No gallop.   Pulmonary:      Effort: Pulmonary effort is normal. No respiratory distress.      Breath sounds: Normal breath sounds. No wheezing, rhonchi or rales.   Chest:      Chest wall: No tenderness.   Abdominal:      General: There is no distension.      Palpations: Abdomen is soft.      Tenderness: There is no abdominal tenderness.   Musculoskeletal:         General: No swelling or tenderness.      Cervical back: Normal range of motion.      Right lower leg: No edema.      " "Left lower leg: No edema.   Skin:     General: Skin is warm and dry.      Findings: No erythema, lesion or rash.   Neurological:      General: No focal deficit present.      Mental Status: She is alert and oriented to person, place, and time. Mental status is at baseline.      Motor: No weakness.      Gait: Gait normal.   Psychiatric:         Mood and Affect: Mood normal.         Behavior: Behavior normal.        Laboratory Data:  No results found for: "WBC", "HGB", "HCT", "MCV", "PLT"  Lab Results   Component Value Date    GLU 92 09/13/2023     09/13/2023    K 4.1 09/13/2023     09/13/2023    CO2 27 09/13/2023    BUN 15 09/13/2023    CREATININE 0.9 09/13/2023    CALCIUM 8.8 09/13/2023    MG 2.0 09/13/2023     No results found for: "INR", "PROTIME"    Pertinent Cardiac Data:  ECG: Normal sinus rhythm with rate of 62 bpm,  ms, IVCD with  ms, QT/QTc 450/456 ms, nonspecific STT changes.     30-Day Ambulatory Event Monitor - 8/2/2023:    Patient had a min HR of 51 bpm, max HR of 167 bpm, and avg HR of 69 bpm. Predominant underlying rhythm was Sinus Rhythm    7 Supraventricular Tachycardia runs occurred, the run with the fastest interval lasting 4 beats with a max rate of 167 bpm, the longest lasting 39.8 secs with an avg rate of 110 bpm. Supraventricular Tachycardia was detected within +/- 45 seconds of symptomatic patient event(s).    Isolated SVEs were rare (<1.0%), SVE Couplets were rare (<1.0%), and no SVE Triplets were present.    Isolated VEs were rare (<1.0%), and no VE Couplets or VE Triplets were present    Patient activated. The patient complained of 14 episodes. The symptom(s) included chest pain and palpitations. The corresponding rhythm to the patient reported event was SVT.    Monitor Reported Event The patient had SVT.    There were 60 auto-triggered events    See final report    Resting 2D Transthoracic Echocardiogram - 8/29/2023 at [facility]:  - Normal systolic " function with LVEF 60-65%. No RWMA.  - Right ventricle normal size and function.   - Normal biatrial size.  - Normal diastolic function.     Pharmacologic Nuclear Cardiac Stress Test - SPECT - 8/30/2023 at [facility]:  - Normal SPECT with no evidence of ischemia or infarction. Normal LVEF. Paroxysmal atrial fibrillation with RVR noted during the study.      ASSESSMENT & PLAN:   Ms. Lor Albarran is a ya 58-year-old woman who returns to clinic today for ongoing evaluation and recommendations regarding paroxysmal atrial fibrillation. She has a past medical history significant for paroxysmal atrial fibrillation, depression, anxiety, and ADHD.     - We continue to discuss the pathophysiology of atrial fibrillation; specifically, we discussed paroxysmal atrial fibrillation and the concept that some patients may experience paroxysms of atrial fibrillation interrupting periods of sinus rhythm. We discussed that even a relatively low burden of atrial fibrillation continues to have an increased risk of CVA.   - She has remained on flecainide 100mg po BID with excellent control of her atrial fibrillation. She has previously failed propafenone 300mg po TID with ineffective control of her atrial fibrillation and reported symptoms of transient nausea and GI upset, and failed a prior trial of sotalol therapy. She has no history of underlying coronary artery disease, and the results of a recent pharmacologic nuclear stress SPECT revealed no evidence of ischemia, with no evidence of structural heart disease appreciated on echocardiogram. Recently obtained laboratory assessment did not reveal any contraindications to flecainide therapy, and this agent should not interact with her other medications. We will continue flecainide 100mg po BID.    - Flecainide displays use-dependency, and jordy blocking agents are generally jointly prescribed with class Ic agents. She is presently maintained on metoprolol succinate 25mg  po daily, with metoprolol tartrate 25mg po PRN an episode of palpitations. We will continue this regimen in addition to her flecainide.   - Her [id number] is only 1 (female gender, age less than 64), portending an annual adjusted risk of CVA of 1.3%. She remains on uninterrupted apixaban therapy with no bleeding events reported.   - We discussed the possibility of catheter ablation with pulmonary vein isolation should she continue to have symptoms and AF despite AAD therapy. She voices understanding, and reports that she would consider ablation in the future in the event her atrial fibrillation burden increases.  - Possible underlying drivers of atrial fibrillation were addressed at this appointment, including recommendations for maintenance of a healthy BMI - now a class I recommendation. Review of laboratory data revealed acceptable TSH at 1.910 at [facility]. A request for sleep study was deferred at this time, as she denies any loud snoring or excessive daytime sleepiness.  - We discussed that her Adderall may act as mild cardiostimulant medication. Generally this effect is insignificant, but may cause her paroxysms of atrial fibrillation to be more frequent. Unfortunately, she does not feel that these agents can be stopped, as her ADHD is presently well-controlled.      This patient will return to clinic with me in six months with continued flecainide antiarrhythmic therapy and maintenance of apixaban in the interim. All questions and concerns were addressed at this encounter.     Signing Physician:       GABO Herzog MD  Electrophysiology Attending

## 2024-09-25 RX ORDER — FLECAINIDE ACETATE 100 MG/1
100 TABLET ORAL EVERY 12 HOURS
Qty: 60 TABLET | Refills: 11 | Status: SHIPPED | OUTPATIENT
Start: 2024-09-25

## 2024-10-30 DIAGNOSIS — I48.0 PAROXYSMAL ATRIAL FIBRILLATION: ICD-10-CM

## 2024-10-31 RX ORDER — APIXABAN 5 MG/1
5 TABLET, FILM COATED ORAL 2 TIMES DAILY
Qty: 180 TABLET | Refills: 3 | Status: SHIPPED | OUTPATIENT
Start: 2024-10-31

## 2025-05-13 ENCOUNTER — OFFICE VISIT (OUTPATIENT)
Dept: CARDIOLOGY | Facility: CLINIC | Age: 60
End: 2025-05-13
Payer: OTHER GOVERNMENT

## 2025-05-13 VITALS
HEART RATE: 51 BPM | DIASTOLIC BLOOD PRESSURE: 70 MMHG | HEIGHT: 67 IN | RESPIRATION RATE: 16 BRPM | BODY MASS INDEX: 23.7 KG/M2 | SYSTOLIC BLOOD PRESSURE: 120 MMHG | OXYGEN SATURATION: 100 % | WEIGHT: 151 LBS

## 2025-05-13 DIAGNOSIS — F33.41 RECURRENT MAJOR DEPRESSIVE DISORDER, IN PARTIAL REMISSION: ICD-10-CM

## 2025-05-13 DIAGNOSIS — I48.0 PAROXYSMAL ATRIAL FIBRILLATION: Primary | ICD-10-CM

## 2025-05-13 DIAGNOSIS — F41.9 ANXIETY: ICD-10-CM

## 2025-05-13 DIAGNOSIS — F90.2 ATTENTION DEFICIT HYPERACTIVITY DISORDER (ADHD), COMBINED TYPE: ICD-10-CM

## 2025-05-13 LAB
OHS QRS DURATION: 112 MS
OHS QTC CALCULATION: 442 MS

## 2025-05-13 PROCEDURE — 99213 OFFICE O/P EST LOW 20 MIN: CPT | Mod: PBBFAC,25,PN | Performed by: STUDENT IN AN ORGANIZED HEALTH CARE EDUCATION/TRAINING PROGRAM

## 2025-05-13 PROCEDURE — 93005 ELECTROCARDIOGRAM TRACING: CPT | Mod: PBBFAC,PN | Performed by: INTERNAL MEDICINE

## 2025-05-13 PROCEDURE — 99999 PR PBB SHADOW E&M-EST. PATIENT-LVL III: CPT | Mod: PBBFAC,,, | Performed by: STUDENT IN AN ORGANIZED HEALTH CARE EDUCATION/TRAINING PROGRAM

## 2025-05-13 PROCEDURE — 99214 OFFICE O/P EST MOD 30 MIN: CPT | Mod: S$PBB,,, | Performed by: STUDENT IN AN ORGANIZED HEALTH CARE EDUCATION/TRAINING PROGRAM

## 2025-05-13 PROCEDURE — 93010 ELECTROCARDIOGRAM REPORT: CPT | Mod: S$PBB,,, | Performed by: INTERNAL MEDICINE

## 2025-05-13 NOTE — PROGRESS NOTES
Electrophysiology Clinic Note    Reason for follow-up patient visit: Ongoing evaluation and recommendations regarding paroxysmal atrial fibrillation.     PRESENTING HISTORY:     History of Present Illness:  Ms. Lor Albarran is a ya 58-year-old woman who returns to clinic today for ongoing evaluation and recommendations regarding paroxysmal atrial fibrillation. She has a past medical history significant for paroxysmal atrial fibrillation, depression, anxiety, and ADHD.     She is followed in general cardiology clinic with Dr. Lockwood and SHILPA Chang and was previously seen in clinic on 9/13/2023. She reports that following a COVID infection in January of 2022 that she started having episodic palpitations. She reports symptoms of palpitations, irregular heart beats, and chest fullness, lasting a matter of seconds to several minutes. She was initially seen in [place] with Dr. Tillman, and was started on propafenone for periods of reported SVT. She later had an event of paroxysmal atrial fibrillation captured on an event monitor and was increased to propafenone 300mg po TID with aspirin 81mg po daily. She reports that she had been doing well on propafenone until mid 2023 when she started having increased episodes of palpitations that would last for longer periods, occasionally more than an hour. She was admitted to [facility] in 5/2023 with continued palpitations with recurrent paroxysmal atrial fibrillation, at times with RVR. She was transitioned to sotalol 80mg po BID. She took this medication for about a month and reports that it was ineffective, and she self-discontinued this agent and resumed therapy with her propafenone 300mg po TID. She was subsequently seen with SHILPA Chang and was started on metoprolol succinate 25mg po daily with metoprolol tartrate 25mg po PRN palpitations. She has cut out all caffeine and is drinking 1-2 glasses of wine nightly. This regimen provided improved control of these events;  "however, she continued to experience breakthrough palpitations about once per week. She is presently taking apixaban and denies any adverse bleeding events on this agent. At our previous appointment, we started flecainide 100mg po BID in addition to continuation of her metoprolol therapy. She reports that she has "been cured" from her atrial fibrillation and denies any subsequent sustained events of palpitations. She continues to report brief events lasting for a matter of seconds, but denies any limiting symptoms. Her father suffered a stoke on 2/1/2024, and she reports increased stress helping to take care of him. She required one dose of her metoprolol tartrate when she felt she experienced a brief event of palpitations. She denies any change in her symptoms while taking her Adderall. She reports heat intolerance and gradual weight gain, and attributes this to "getting older" and increased stress with her father's poor health.     Flecainide 100mg po BID  Apixaban 5mg po BID  Metoprolol succinate 25mg po nightly  Tartrate only if needed never yesed since last year    In discussion with Ms. Ablarran today, she tells me that she is feeling overall quite well, and feels "much better" since starting flecainide therapy. She denies any episodes of dizziness, lightheadedness, syncope/presyncope, shelton chest pain, nausea or vomiting, orthopnea, or PND. She reports intermittent palpitations as above, in addition to symptoms of chest and neck fullness. These events are decreased in frequency and duration while maintained on flecainide therapy. She reports mild baseline shortness of breath and dyspnea with significant exertion that remains stable. She can climb more than one flight of stairs prior to needing to take a break and continues to attempt to increase her level of physical activity. She reports continued poor sleep and occasional daytime fatigue.    Review of Systems:  Review of Systems   Constitutional:  Positive " for fatigue. Negative for activity change.   HENT:  Positive for rhinorrhea and sinus pressure/congestion. Negative for nasal congestion, nosebleeds, postnasal drip, sneezing and sore throat.    Respiratory:  Positive for chest tightness and shortness of breath. Negative for apnea, cough and wheezing.    Cardiovascular:  Positive for palpitations. Negative for chest pain and leg swelling.   Gastrointestinal:  Positive for reflux. Negative for abdominal distention, abdominal pain, anal bleeding, blood in stool, change in bowel habit, constipation, diarrhea, nausea and vomiting.   Endocrine: Positive for heat intolerance.   Genitourinary:  Negative for dysuria and hematuria.   Musculoskeletal:  Positive for arthralgias and back pain. Negative for gait problem.   Neurological:  Positive for headaches. Negative for dizziness, seizures, syncope, weakness, light-headedness and coordination difficulties.   Psychiatric/Behavioral:  Positive for sleep disturbance.         PAST HISTORY:     Past Medical History:  - Paroxysmal atrial fibrillation  - Depression  - Anxiety  - ADHD    No past surgical history on file.    Family History:  She reports that her father has permanent atrial fibrillation and recently suffered a stroke on 2/1/2024.     Social History:  She  reports that she has never smoked. She has never used smokeless tobacco. No history on file for alcohol use and drug use. 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     MEDICATIONS & ALLERGIES:     Review of patient's allergies indicates:   Allergen Reactions    Sulfa (sulfonamide antibiotics)        Current Outpatient Medications on File Prior to Visit   Medication Sig Dispense Refill    dextroamphetamine-amphetamine (ADDERALL XR) 10 MG 24 hr capsule Take 10 mg by mouth every morning. Half when working      dextroamphetamine-amphetamine (ADDERALL XR) 20 MG 24 hr capsule Take 20 mg by mouth every morning.      diazePAM  (VALIUM) 2 MG tablet Take 2 mg by mouth every 6 (six) hours as needed (muscle spasms).      ELIQUIS 5 mg Tab TAKE 1 TABLET TWICE A  tablet 3    estradioL (ESTRACE) 1 MG tablet Take 1 mg by mouth.      flecainide (TAMBOCOR) 100 MG Tab TAKE 1 TABLET(100 MG) BY MOUTH EVERY 12 HOURS 60 tablet 11    magnesium oxide (MAG-OX) 400 mg (241.3 mg magnesium) tablet Take 1 tablet (400 mg total) by mouth once daily. 45 tablet 3    metoprolol succinate (TOPROL-XL) 25 MG 24 hr tablet Take 1 tablet (25 mg total) by mouth every evening. 90 tablet 3    metoprolol tartrate (LOPRESSOR) 25 MG tablet TAKE 2 TABLET BY MOUTH DAILY AS NEEDED 90 tablet 3    potassium chloride (KLOR-CON) 10 MEQ TbSR Take 10 mEq by mouth.       No current facility-administered medications on file prior to visit.        OBJECTIVE:     Vital Signs:  There were no vitals taken for this visit.    Physical Exam:  Physical Exam  Constitutional:       General: She is not in acute distress.     Appearance: Normal appearance. She is not ill-appearing or diaphoretic.      Comments: Well-appearing woman in NAD.   HENT:      Head: Normocephalic and atraumatic.      Nose: Nose normal.      Mouth/Throat:      Mouth: Mucous membranes are moist.      Pharynx: Oropharynx is clear.   Eyes:      Pupils: Pupils are equal, round, and reactive to light.   Cardiovascular:      Rate and Rhythm: Normal rate and regular rhythm.      Pulses: Normal pulses.      Heart sounds: Normal heart sounds. No murmur heard.     No friction rub. No gallop.   Pulmonary:      Effort: Pulmonary effort is normal. No respiratory distress.      Breath sounds: Normal breath sounds. No wheezing, rhonchi or rales.   Chest:      Chest wall: No tenderness.   Abdominal:      General: There is no distension.      Palpations: Abdomen is soft.      Tenderness: There is no abdominal tenderness.   Musculoskeletal:         General: No swelling or tenderness.      Cervical back: Normal range of motion.       "Right lower leg: No edema.      Left lower leg: No edema.   Skin:     General: Skin is warm and dry.      Findings: No erythema, lesion or rash.   Neurological:      General: No focal deficit present.      Mental Status: She is alert and oriented to person, place, and time. Mental status is at baseline.      Motor: No weakness.      Gait: Gait normal.   Psychiatric:         Mood and Affect: Mood normal.         Behavior: Behavior normal.        Laboratory Data:  No results found for: "WBC", "HGB", "HCT", "MCV", "PLT"  Lab Results   Component Value Date    GLU 92 09/13/2023     09/13/2023    K 4.1 09/13/2023     09/13/2023    CO2 27 09/13/2023    BUN 15 09/13/2023    CREATININE 0.9 09/13/2023    CALCIUM 8.8 09/13/2023    MG 2.0 09/13/2023     No results found for: "INR", "PROTIME"    Pertinent Cardiac Data:  ECG: Normal sinus rhythm with rate of 62 bpm,  ms, IVCD with  ms, QT/QTc 450/456 ms, nonspecific STT changes.     30-Day Ambulatory Event Monitor - 8/2/2023:    Patient had a min HR of 51 bpm, max HR of 167 bpm, and avg HR of 69 bpm. Predominant underlying rhythm was Sinus Rhythm    7 Supraventricular Tachycardia runs occurred, the run with the fastest interval lasting 4 beats with a max rate of 167 bpm, the longest lasting 39.8 secs with an avg rate of 110 bpm. Supraventricular Tachycardia was detected within +/- 45 seconds of symptomatic patient event(s).    Isolated SVEs were rare (<1.0%), SVE Couplets were rare (<1.0%), and no SVE Triplets were present.    Isolated VEs were rare (<1.0%), and no VE Couplets or VE Triplets were present    Patient activated. The patient complained of 14 episodes. The symptom(s) included chest pain and palpitations. The corresponding rhythm to the patient reported event was SVT.    Monitor Reported Event The patient had SVT.    There were 60 auto-triggered events    See final report    Resting 2D Transthoracic Echocardiogram - 8/29/2023 at [facility]:  - Normal systolic function with LVEF 60-65%. No RWMA.  - Right ventricle normal size and function.   - Normal biatrial size.  - Normal diastolic function.     Pharmacologic Nuclear Cardiac Stress Test - SPECT - 8/30/2023 at [facility]:  - Normal SPECT with no evidence of ischemia or infarction. Normal LVEF. Paroxysmal atrial fibrillation with RVR noted during the study.      ASSESSMENT & PLAN:   Ms. Lor Albarran is a ya 58-year-old woman who returns to clinic today for ongoing evaluation and recommendations regarding paroxysmal atrial fibrillation. She has a past medical history significant for paroxysmal atrial fibrillation, depression, anxiety, and ADHD.     - We continue to discuss the pathophysiology of atrial fibrillation; specifically, we discussed paroxysmal atrial fibrillation and the concept that some patients may experience paroxysms of atrial fibrillation interrupting periods of sinus rhythm. We discussed that even a relatively low burden of atrial fibrillation continues to have an increased risk of CVA.   - She has remained on flecainide 100mg po BID with excellent control of her atrial fibrillation. She has previously failed propafenone 300mg po TID with ineffective control of her atrial fibrillation and reported symptoms of transient nausea and GI upset, and failed a prior trial of sotalol therapy. She has no history of underlying coronary artery disease, and the results of a recent pharmacologic nuclear stress SPECT revealed no evidence of ischemia, with no evidence of structural heart disease appreciated on echocardiogram. Recently obtained laboratory assessment did not reveal any contraindications to flecainide therapy, and this agent should not interact with her other medications. We will continue flecainide 100mg po BID.    - Flecainide displays use-dependency, and jordy blocking agents are generally jointly prescribed with class Ic agents. She is presently maintained on  metoprolol succinate 25mg po daily, with metoprolol tartrate 25mg po PRN an episode of palpitations. We will continue this regimen in addition to her flecainide.   - Her [id number] is only 1 (female gender, age less than 64), portending an annual adjusted risk of CVA of 1.3%. She remains on uninterrupted apixaban therapy with no bleeding events reported.   - We discussed the possibility of catheter ablation with pulmonary vein isolation should she continue to have symptoms and AF despite AAD therapy. She voices understanding, and reports that she would consider ablation in the future in the event her atrial fibrillation burden increases.  - Possible underlying drivers of atrial fibrillation were addressed at this appointment, including recommendations for maintenance of a healthy BMI - now a class I recommendation. Review of laboratory data revealed acceptable TSH at 1.910 at [place]. A request for sleep study was deferred at this time, as she denies any loud snoring or excessive daytime sleepiness.  - We discussed that her Adderall may act as mild cardiostimulant medication. Generally this effect is insignificant, but may cause her paroxysms of atrial fibrillation to be more frequent. Unfortunately, she does not feel that these agents can be stopped, as her ADHD is presently well-controlled.      This patient will return to clinic with me in six months with continued flecainide antiarrhythmic therapy and maintenance of apixaban in the interim. All questions and concerns were addressed at this encounter.     Signing Physician:       GABO Herzog MD  Electrophysiology Attending

## 2025-06-22 PROBLEM — F33.41 RECURRENT MAJOR DEPRESSIVE DISORDER, IN PARTIAL REMISSION: Status: ACTIVE | Noted: 2025-06-22

## 2025-06-22 PROBLEM — I47.10 SVT (SUPRAVENTRICULAR TACHYCARDIA): Status: RESOLVED | Noted: 2023-09-13 | Resolved: 2025-06-22
